# Patient Record
Sex: FEMALE | Race: WHITE | Employment: UNEMPLOYED | URBAN - NONMETROPOLITAN AREA
[De-identification: names, ages, dates, MRNs, and addresses within clinical notes are randomized per-mention and may not be internally consistent; named-entity substitution may affect disease eponyms.]

---

## 2020-11-07 ENCOUNTER — HOSPITAL ENCOUNTER (EMERGENCY)
Age: 45
Discharge: HOME | End: 2020-11-07
Attending: EMERGENCY MEDICINE
Payer: COMMERCIAL

## 2020-11-07 VITALS
HEART RATE: 97 BPM | HEIGHT: 63 IN | RESPIRATION RATE: 18 BRPM | OXYGEN SATURATION: 97 % | WEIGHT: 220 LBS | BODY MASS INDEX: 38.98 KG/M2 | TEMPERATURE: 97.5 F | SYSTOLIC BLOOD PRESSURE: 179 MMHG | DIASTOLIC BLOOD PRESSURE: 117 MMHG

## 2020-11-07 PROCEDURE — 96372 THER/PROPH/DIAG INJ SC/IM: CPT

## 2020-11-07 PROCEDURE — 6360000002 HC RX W HCPCS: Performed by: STUDENT IN AN ORGANIZED HEALTH CARE EDUCATION/TRAINING PROGRAM

## 2020-11-07 PROCEDURE — 99282 EMERGENCY DEPT VISIT SF MDM: CPT

## 2020-11-07 RX ORDER — HYDROCODONE BITARTRATE AND ACETAMINOPHEN 5; 325 MG/1; MG/1
1 TABLET ORAL EVERY 4 HOURS PRN
Qty: 12 TABLET | Refills: 0 | Status: SHIPPED | OUTPATIENT
Start: 2020-11-07 | End: 2020-11-09

## 2020-11-07 RX ORDER — KETOROLAC TROMETHAMINE 30 MG/ML
30 INJECTION, SOLUTION INTRAMUSCULAR; INTRAVENOUS ONCE
Status: COMPLETED | OUTPATIENT
Start: 2020-11-07 | End: 2020-11-07

## 2020-11-07 RX ORDER — BUPIVACAINE HYDROCHLORIDE 2.5 MG/ML
30 INJECTION, SOLUTION EPIDURAL; INFILTRATION; INTRACAUDAL ONCE
Status: DISCONTINUED | OUTPATIENT
Start: 2020-11-07 | End: 2020-11-07

## 2020-11-07 RX ORDER — BUPIVACAINE HYDROCHLORIDE 5 MG/ML
INJECTION, SOLUTION EPIDURAL; INTRACAUDAL
Status: DISCONTINUED
Start: 2020-11-07 | End: 2020-11-07 | Stop reason: HOSPADM

## 2020-11-07 RX ADMIN — KETOROLAC TROMETHAMINE 30 MG: 30 INJECTION, SOLUTION INTRAMUSCULAR; INTRAVENOUS at 13:54

## 2020-11-07 ASSESSMENT — ENCOUNTER SYMPTOMS
EYE PAIN: 0
SHORTNESS OF BREATH: 0
BLOOD IN STOOL: 0
NAUSEA: 0
ABDOMINAL PAIN: 0
COUGH: 0
TROUBLE SWALLOWING: 0
VOMITING: 0
DIARRHEA: 0
CONSTIPATION: 0
BACK PAIN: 1

## 2020-11-07 ASSESSMENT — PAIN SCALES - GENERAL
PAINLEVEL_OUTOF10: 10
PAINLEVEL_OUTOF10: 4

## 2020-11-07 ASSESSMENT — PAIN DESCRIPTION - DESCRIPTORS: DESCRIPTORS: SHARP;SHOOTING

## 2020-11-07 ASSESSMENT — PAIN DESCRIPTION - ORIENTATION: ORIENTATION: LEFT;RIGHT;LOWER

## 2020-11-07 ASSESSMENT — PAIN DESCRIPTION - FREQUENCY: FREQUENCY: CONTINUOUS

## 2020-11-07 ASSESSMENT — PAIN DESCRIPTION - PAIN TYPE: TYPE: ACUTE PAIN

## 2020-11-07 ASSESSMENT — PAIN DESCRIPTION - LOCATION: LOCATION: BACK

## 2020-11-07 NOTE — ED PROVIDER NOTES
[facility] ENCOUNTER          Pt Name: Mook Woods  MRN: [id number]  [place] 1975  Date of evaluation: 11/7/2020  Treating Resident Physician: Andrew Cox DO  Supervising Physician: Dr. Daja Lopez       Chief Complaint   Patient presents with    Back Pain     Scheduled for fusion in December    Spasms     Back     History obtained from the patient. HISTORY OF PRESENT ILLNESS    HPI  Mook Woods is a 39 y.o. female who presents to the emergency department for evaluation of acute on chronic low back pain. Patient is from [place] and is currently visiting her fiancé in [place]. She has never been seen at [facility] prior. Patient states pain began around 3 hours ago while she was laying on her fiancé's couch. She has a longstanding history of low back pain with bilateral neuropathic pain. She is currently on gabapentin 300 mg once in the evening as well as Norco 5/325 twice daily. She states that she took her Norco this morning prior to having worsening back pain. She states that the pain starts in her buttocks region and then travels down her bilateral lower extremities to the knee. Pain feels neuropathic in nature and is very similar to her previous episodes of neuro pathic pain. Currently she denies any loss of bowel or bladder control. She denies any saddle anesthesia. She admits to difficulty with walking due to the pain however she denies any weakness. She denies any trauma to the area. She is scheduled for a fusion of her lower lumbar spine in December of this year. The patient has no other acute complaints at this time. REVIEW OF SYSTEMS   Review of Systems   Constitutional: Negative for chills, fatigue and fever. HENT: Negative for ear pain, postnasal drip and trouble swallowing. Eyes: Negative for pain and visual disturbance.    Respiratory: Negative for cough and shortness of breath. Cardiovascular: Negative for chest pain and palpitations. Gastrointestinal: Negative for abdominal pain, blood in stool, constipation, diarrhea, nausea and vomiting. Genitourinary: Negative for dysuria and urgency. Musculoskeletal: Positive for arthralgias, back pain and gait problem. Skin: Negative for rash and wound. Neurological: Negative for dizziness, weakness, numbness and headaches. Psychiatric/Behavioral: Negative for dysphoric mood. The patient is not nervous/anxious. PAST MEDICAL AND SURGICAL HISTORY   No past medical history on file. No past surgical history on file. MEDICATIONS     Current Facility-Administered Medications:     bupivacaine (PF) (MARCAINE) 0.5 % injection, , , ,   No current outpatient medications on file. SOCIAL HISTORY     Social History     Social History Narrative    Not on file     Social History     Tobacco Use    Smoking status: Not on file   Substance Use Topics    Alcohol use: Not on file    Drug use: Not on file       ALLERGIES     Allergies   Allergen Reactions    Sulfa Antibiotics Hives       FAMILY HISTORY   No family history on file. PREVIOUS RECORDS   Previous records reviewed    PHYSICAL EXAM     ED Triage Vitals [11/07/20 1245]   BP Temp Temp Source Pulse Resp SpO2 Height Weight   (!) 179/117 97.5 °F (36.4 °C) Oral 97 18 97 % 5' 3\" (1.6 m) 220 lb (99.8 kg)     Initial vital signs and nursing assessment reviewed and abnormal with elevated blood pressure of 179/117. Pulsoximetry is normal per my interpretation. Additional Vital Signs:  Vitals:    11/07/20 1245   BP: (!) 179/117   Pulse: 97   Resp: 18   Temp: 97.5 °F (36.4 °C)   SpO2: 97%       Physical Exam  Vitals signs and nursing note reviewed. Constitutional:       General: She is not in acute distress. Appearance: She is well-developed. She is not diaphoretic. HENT:      Head: Normocephalic and atraumatic.       Right Ear: External ear normal. Left Ear: External ear normal.      Nose: Nose normal.   Eyes:      General: No scleral icterus. Right eye: No discharge. Left eye: No discharge. Conjunctiva/sclera: Conjunctivae normal.   Neck:      Musculoskeletal: Normal range of motion. Cardiovascular:      Rate and Rhythm: Normal rate and regular rhythm. Heart sounds: Normal heart sounds. No murmur. Pulmonary:      Effort: Pulmonary effort is normal.      Breath sounds: Normal breath sounds. Musculoskeletal:      Lumbar back: She exhibits tenderness and pain. She exhibits no bony tenderness. Comments: Bilateral paraspinal tenderness to palpation in the area lower lumbar spine near L5 and L4. Patient with shooting pain down her bilateral lower extremities. Skin:     General: Skin is warm and dry. Findings: No erythema or rash. Neurological:      Mental Status: She is alert and oriented to person, place, and time. Cranial Nerves: No cranial nerve deficit. Psychiatric:         Behavior: Behavior normal.         Thought Content: Thought content normal.         Judgment: Judgment normal.         MEDICAL DECISION MAKING   Initial Assessment: Patient was seen and evaluated. Patient was in no acute distress. Vital signs show an elevated blood pressure at 179/117. Initial assessment is that patient is suffering from acute on chronic low back pain due to history of degenerative disc disease as well as degenerative joint disease, patient symptoms do not line up with surgical emergencies including cauda equina syndrome, spinal abscess, acute fracture. Plan: At this time with no red flag symptoms do not believe x-rays would benefit patient at this time. Patient has had full work-up in [place] to presentation today. Patient will be following up with her surgeon on Monday.   At this time patient will be given 30 mg of Toradol IM with minimal resolution of pain, patient instructed to increase gabapentin dose to 300 mg 3 times daily. Patient will be given bupivacaine injection for lower right lumbar tender point. Patient tolerated the procedure well. She will be discharged home with follow up to PCP and Orthopedic surgeon when she returns to [place]. ED RESULTS   Laboratory results:  Labs Reviewed - No data to display    Radiologic studies results:  No orders to display       ED Medications administered this visit:   Medications   bupivacaine (PF) (MARCAINE) 0.5 % injection (has no administration in time range)   ketorolac (TORADOL) injection 30 mg (30 mg Intramuscular Given 11/7/20 1354)       ED COURSE   Strict return precautions and follow up instructions were discussed with the patient prior to discharge, with which the patient agrees. MEDICATION CHANGES     New Prescriptions    No medications on file       FINAL DISPOSITION     Final diagnoses:   Acute exacerbation of chronic low back pain   Neuropathic pain     Condition: condition: good  Dispo: Discharge to home    This transcription was electronically signed. Parts of this transcriptions may have been dictated by use of voice recognition software and electronically transcribed, and parts may have been transcribed with the assistance of an ED scribe. The transcription may contain errors not detected in proofreading. Please refer to my supervising physician's documentation if my documentation differs.     Electronically Signed: Wero Nassar, 11/07/20, 3:29 PM       Wero Nassar DO  Resident  11/07/20 9276

## 2020-11-07 NOTE — ED PROVIDER NOTES
[facility]  EMERGENCY MEDICINE ATTENDING ATTESTATION      Evaluation of Karl. Case discussed and care plan developed with resident physician. I agree with the resident physician documentation and plan as documented by him, except if my documentation differs. Patient seen, interviewed and examined by me. I reviewed the medical, surgical, family and social history, medications and allergies. I have reviewed the nursing documentation. I have reviewed the patient's vital signs and are abnormal from Hypertension per my interpretation. Pulsoxymetry is normal per my interpretation. Brief H&P   Patient c/o worsened chronic back pain with sciatica on the right side today. Patient is visiting the area from [place] and states her pain worsened after traveling here. Denies fever, incontinence, difficulty walking. Patient is already taking gabapentin once a day and Norco daily, and is waiting for spinal fusion later on this month back home. Physical exam is notable for well appearing, negative straight leg raise, there are bilateral tender muscle spasms in the paraspinal lumbar area, there is point tenderness in the right paraspinal lumbar area. Medical Decision Making   MDM: Acute on chronic back pain. Plan: Analgesia, observation. Will increase Neurontin dose. Please see the resident physician completed note for final disposition except as documented on this attestation. I have reviewed and interpreted all available lab, radiology and ekg results available at the moment. Diagnosis, treatment and disposition plans were discussed and agreed upon by patient. This transcription was electronically signed. It was dictated by use of voice recognition software and electronically transcribed. The transcription may contain errors not detected in proofreading.      I performed direct supervision and was present for the critical portion following procedures: None  Critical care time on this case: None    Electronically signed by Elsy De Los Santos MD on 11/7/20 at 2:20 PM EST       Elsy De Los Santos MD  11/07/20 4709